# Patient Record
Sex: MALE | Race: BLACK OR AFRICAN AMERICAN | NOT HISPANIC OR LATINO | Employment: FULL TIME | URBAN - METROPOLITAN AREA
[De-identification: names, ages, dates, MRNs, and addresses within clinical notes are randomized per-mention and may not be internally consistent; named-entity substitution may affect disease eponyms.]

---

## 2021-07-30 ENCOUNTER — APPOINTMENT (OUTPATIENT)
Dept: RADIOLOGY | Facility: MEDICAL CENTER | Age: 26
End: 2021-07-30
Attending: EMERGENCY MEDICINE

## 2021-07-30 ENCOUNTER — HOSPITAL ENCOUNTER (EMERGENCY)
Facility: MEDICAL CENTER | Age: 26
End: 2021-07-30
Attending: EMERGENCY MEDICINE

## 2021-07-30 VITALS
DIASTOLIC BLOOD PRESSURE: 90 MMHG | TEMPERATURE: 98.2 F | BODY MASS INDEX: 42.66 KG/M2 | OXYGEN SATURATION: 95 % | HEART RATE: 94 BPM | SYSTOLIC BLOOD PRESSURE: 148 MMHG | HEIGHT: 72 IN | WEIGHT: 315 LBS | RESPIRATION RATE: 20 BRPM

## 2021-07-30 DIAGNOSIS — R06.02 SOB (SHORTNESS OF BREATH): ICD-10-CM

## 2021-07-30 DIAGNOSIS — R60.9 PERIPHERAL EDEMA: ICD-10-CM

## 2021-07-30 LAB
ANION GAP SERPL CALC-SCNC: 16 MMOL/L (ref 7–16)
BASOPHILS # BLD AUTO: 0.4 % (ref 0–1.8)
BASOPHILS # BLD: 0.05 K/UL (ref 0–0.12)
BUN SERPL-MCNC: 10 MG/DL (ref 8–22)
CALCIUM SERPL-MCNC: 9.9 MG/DL (ref 8.4–10.2)
CHLORIDE SERPL-SCNC: 101 MMOL/L (ref 96–112)
CO2 SERPL-SCNC: 21 MMOL/L (ref 20–33)
CREAT SERPL-MCNC: 1 MG/DL (ref 0.5–1.4)
EKG IMPRESSION: NORMAL
EOSINOPHIL # BLD AUTO: 0.01 K/UL (ref 0–0.51)
EOSINOPHIL NFR BLD: 0.1 % (ref 0–6.9)
ERYTHROCYTE [DISTWIDTH] IN BLOOD BY AUTOMATED COUNT: 42.4 FL (ref 35.9–50)
GLUCOSE SERPL-MCNC: 73 MG/DL (ref 65–99)
HCT VFR BLD AUTO: 44.5 % (ref 42–52)
HGB BLD-MCNC: 14.4 G/DL (ref 14–18)
IMM GRANULOCYTES # BLD AUTO: 0.07 K/UL (ref 0–0.11)
IMM GRANULOCYTES NFR BLD AUTO: 0.5 % (ref 0–0.9)
LYMPHOCYTES # BLD AUTO: 3.18 K/UL (ref 1–4.8)
LYMPHOCYTES NFR BLD: 24.6 % (ref 22–41)
MCH RBC QN AUTO: 27 PG (ref 27–33)
MCHC RBC AUTO-ENTMCNC: 32.4 G/DL (ref 33.7–35.3)
MCV RBC AUTO: 83.3 FL (ref 81.4–97.8)
MONOCYTES # BLD AUTO: 0.93 K/UL (ref 0–0.85)
MONOCYTES NFR BLD AUTO: 7.2 % (ref 0–13.4)
NEUTROPHILS # BLD AUTO: 8.7 K/UL (ref 1.82–7.42)
NEUTROPHILS NFR BLD: 67.2 % (ref 44–72)
NRBC # BLD AUTO: 0 K/UL
NRBC BLD-RTO: 0 /100 WBC
NT-PROBNP SERPL IA-MCNC: 47 PG/ML (ref 0–125)
PLATELET # BLD AUTO: 217 K/UL (ref 164–446)
PMV BLD AUTO: 11.7 FL (ref 9–12.9)
POTASSIUM SERPL-SCNC: 3.7 MMOL/L (ref 3.6–5.5)
RBC # BLD AUTO: 5.34 M/UL (ref 4.7–6.1)
SODIUM SERPL-SCNC: 138 MMOL/L (ref 135–145)
TROPONIN T SERPL-MCNC: 15 NG/L (ref 6–19)
WBC # BLD AUTO: 12.9 K/UL (ref 4.8–10.8)

## 2021-07-30 PROCEDURE — 93005 ELECTROCARDIOGRAM TRACING: CPT | Performed by: EMERGENCY MEDICINE

## 2021-07-30 PROCEDURE — 83880 ASSAY OF NATRIURETIC PEPTIDE: CPT

## 2021-07-30 PROCEDURE — 94760 N-INVAS EAR/PLS OXIMETRY 1: CPT

## 2021-07-30 PROCEDURE — 99284 EMERGENCY DEPT VISIT MOD MDM: CPT

## 2021-07-30 PROCEDURE — 93005 ELECTROCARDIOGRAM TRACING: CPT

## 2021-07-30 PROCEDURE — 85025 COMPLETE CBC W/AUTO DIFF WBC: CPT

## 2021-07-30 PROCEDURE — 84484 ASSAY OF TROPONIN QUANT: CPT

## 2021-07-30 PROCEDURE — 71046 X-RAY EXAM CHEST 2 VIEWS: CPT

## 2021-07-30 PROCEDURE — 80048 BASIC METABOLIC PNL TOTAL CA: CPT

## 2021-07-30 RX ORDER — KETOROLAC TROMETHAMINE 30 MG/ML
30 INJECTION, SOLUTION INTRAMUSCULAR; INTRAVENOUS ONCE
Status: DISCONTINUED | OUTPATIENT
Start: 2021-07-30 | End: 2021-07-31 | Stop reason: HOSPADM

## 2021-07-31 NOTE — ED PROVIDER NOTES
"ED Provider Note        Primary care provider: No primary care provider on file.    I verified that the patient was wearing a mask and I was wearing appropriate PPE every time I entered the room. The patient's mask was on the patient at all times during my encounter except for a brief view of the oropharynx.      CHIEF COMPLAINT  Chief Complaint   Patient presents with   • Shortness of Breath     Reports swollen feet, SOB, and syncope \"after a 5.5 mile walk\" today.        HPI  Rick Smith is a 25 y.o. male who presents to the Emergency Department with chief complaint of foot pain.  Patient also reports that his feet are slightly swollen and he is having some shortness of breath.  Patient states that he had to walk 5-1/2 miles today.  He states that he was walking this far to go to [employer] to be cleared to return to work.  Patient was put on work restrictions after he had a accident that resulted in some right chest wall pain.  Pain is rated as a 20 out of 10 at this time he also reports some shortness of breath.  No fevers no chills he has been vaccinated against COVID-19 he denies any urinary or bowel issues no other acute symptoms or concerns at this time.    REVIEW OF SYSTEMS  10 systems reviewed and otherwise negative, pertinent positives and negatives listed in the history of present illness.    PAST MEDICAL HISTORY   has a past medical history of Hypertension.    SURGICAL HISTORY  patient denies any surgical history    SOCIAL HISTORY  Social History     Tobacco Use   • Smoking status: Current Every Day Smoker     Types: Cigarettes   • Smokeless tobacco: Never Used   • Tobacco comment: 1pk/week   Substance Use Topics   • Alcohol use: Yes     Comment: occ   • Drug use: Yes     Comment: chandrika      Social History     Substance and Sexual Activity   Drug Use Yes    Comment: chandrika       FAMILY HISTORY  Non-Contributory    CURRENT MEDICATIONS  Home Medications    **Home medications have not " yet been reviewed for this encounter**         ALLERGIES  No Known Allergies    PHYSICAL EXAM  VITAL SIGNS: /88   Pulse (!) 106   Temp 36.8 °C (98.2 °F) (Temporal)   Resp (!) 22   Ht 1.829 m (6')   Wt (!) 218 kg (479 lb 15.1 oz)   SpO2 95%   BMI 65.09 kg/m²   Pulse ox interpretation: I interpret this pulse ox as normal.  Constitutional: Alert and oriented x 3, no acute distress  HEENT: Atraumatic normocephalic, pupils are equal round, extraocular movements are intact. The nares is clear, external ears are normal, mouth shows moist mucous membranes  Neck: no obvious JVD or tracheal deviation  Cardiovascular: Regular rate and rhythm no murmur rub or gallop   Thorax & Lungs: No respiratory distress, no wheezes rales or rhonchi, No chest tenderness.   GI: Morbidly obese, soft nontender nondistended positive bowel sounds, no peritoneal signs  Skin: Warm dry no obvious acute rash or lesion  Musculoskeletal: Moving all extremities with normal range strength, no acute  deformity  Neurologic: Cranial nerves III through XII are grossly intact, no sensory deficit, no cerebellar dysfunction   Psychiatric: Appropriate affect for situation at this time      DIAGNOSTIC STUDIES / PROCEDURES  LABS      Results for orders placed or performed during the hospital encounter of 07/30/21   CBC w/ Differential   Result Value Ref Range    WBC 12.9 (H) 4.8 - 10.8 K/uL    RBC 5.34 4.70 - 6.10 M/uL    Hemoglobin 14.4 14.0 - 18.0 g/dL    Hematocrit 44.5 42.0 - 52.0 %    MCV 83.3 81.4 - 97.8 fL    MCH 27.0 27.0 - 33.0 pg    MCHC 32.4 (L) 33.7 - 35.3 g/dL    RDW 42.4 35.9 - 50.0 fL    Platelet Count 217 164 - 446 K/uL    MPV 11.7 9.0 - 12.9 fL    Neutrophils-Polys 67.20 44.00 - 72.00 %    Lymphocytes 24.60 22.00 - 41.00 %    Monocytes 7.20 0.00 - 13.40 %    Eosinophils 0.10 0.00 - 6.90 %    Basophils 0.40 0.00 - 1.80 %    Immature Granulocytes 0.50 0.00 - 0.90 %    Nucleated RBC 0.00 /100 WBC    Neutrophils (Absolute) 8.70 (H) 1.82 -  7.42 K/uL    Lymphs (Absolute) 3.18 1.00 - 4.80 K/uL    Monos (Absolute) 0.93 (H) 0.00 - 0.85 K/uL    Eos (Absolute) 0.01 0.00 - 0.51 K/uL    Baso (Absolute) 0.05 0.00 - 0.12 K/uL    Immature Granulocytes (abs) 0.07 0.00 - 0.11 K/uL    NRBC (Absolute) 0.00 K/uL   Basic Metabolic Panel (BMP)   Result Value Ref Range    Sodium 138 135 - 145 mmol/L    Potassium 3.7 3.6 - 5.5 mmol/L    Chloride 101 96 - 112 mmol/L    Co2 21 20 - 33 mmol/L    Glucose 73 65 - 99 mg/dL    Bun 10 8 - 22 mg/dL    Creatinine 1.00 0.50 - 1.40 mg/dL    Calcium 9.9 8.4 - 10.2 mg/dL    Anion Gap 16.0 7.0 - 16.0   proBrain Natriuretic Peptide, NT   Result Value Ref Range    NT-proBNP 47 0 - 125 pg/mL   Troponin STAT   Result Value Ref Range    Troponin T 15 6 - 19 ng/L   ESTIMATED GFR   Result Value Ref Range    GFR If African American >60 >60 mL/min/1.73 m 2    GFR If Non African American >60 >60 mL/min/1.73 m 2   EKG   Result Value Ref Range    Report       [facility] Emergency Dept.    Test Date:  2021  Pt Name:    SRINI PERDOMO                   Department: [facility]  MRN:        [id number]                      Room:  Gender:     Male                         Technician: GT  :        1995                   Requested By:ER TRIAGE PROTOCOL  Order #:    [id number]                    Reading MD: EUSEBIO KINNEY MD    Measurements  Intervals                                Axis  Rate:       95                           P:          74  WA:         180                          QRS:        -3  QRSD:       87                           T:          28  QT:         339  QTc:        426    Interpretive Statements  normal sinus rhythm at  95   , no ST elevation no ST depression no T-wave  inversion appropriate R-wave progression normal axis normal intervals no  other  ischemic or arrhythmic features, Q waves limited to lead III  Electronically Signed On 2021 22:08:39 PDT by EUSEBIO KINNEY MD         All labs reviewed by  me.      RADIOLOGY  DX-CHEST-2 VIEWS   Final Result      No radiographic evidence of acute cardiopulmonary process.        The radiologist's interpretation of all radiological studies have been reviewed by me.    COURSE & MEDICAL DECISION MAKING  Pertinent Labs & Imaging studies reviewed. (See chart for details)    8:52 PM - Patient seen and examined at bedside.         Patient noted to have slightly elevated blood pressure likely circumstantial secondary to presenting complaint. Referred to primary care physician for further evaluation.        Medical Decision Makin-year-old male morbidly obese 218 kg reports that he walks 5-1/2 miles and became dehydrated overheated notes some leg pain.  He is no longer having pain at this time his chest x-ray is negative his troponin is negative BNP is negative other labs as above showed no sign of dehydration his vital signs are unremarkable.  Patient's requesting food and drink at this time in no apparent distress vital signs are improved given instructions to follow-up with primary care to return here for any worsening symptoms or concerns otherwise discharged stable and improved condition.    /90   Pulse 94   Temp 36.8 °C (98.2 °F) (Temporal)   Resp 20   Ht 1.829 m (6')   Wt (!) 218 kg (479 lb 15.1 oz)   SpO2 95%   BMI 65.09 kg/m²     [address] [address] [phone]  Schedule an appointment as soon as possible for a visit   for establishment of primary care, for blood pressure management    [facility], Emergency Dept  [address]  [facility] [address] [phone]    in 12-24 hours if symptoms persist, immediately If symptoms worsen, or if you develop any other symptoms or concerns      There are no discharge medications for this patient.      FINAL IMPRESSION  1. SOB (shortness of breath) Active   2. Peripheral edema Active   3.  Morbid obesity      This dictation has been  created using voice recognition software and/or scribes. The accuracy of the dictation is limited by the abilities of the software and the expertise of the scribes. I expect there may be some errors of grammar and possibly content. I made every attempt to manually correct the errors within my dictation. However, errors related to voice recognition software and/or scribes may still exist and should be interpreted within the appropriate context.

## 2021-07-31 NOTE — ED NOTES
AYE Kam after this pt walked 5 miles to [facility].He was asking for water but refused [facility] offer for water. He then requested to come to the ED.CRISTINA reports that this is his normal modus operandi.

## 2021-07-31 NOTE — ED NOTES
Patient verbalized understanding to plan of care and discharge information. Patient in stable condition. No signs of distress. Patient pain free. Patient ambulatory out of ED to waiting area with stable gait by self. Patient given cab voucher to return home.

## 2021-08-16 ENCOUNTER — HOSPITAL ENCOUNTER (EMERGENCY)
Age: 26
Discharge: HOME | End: 2021-08-17

## 2021-08-16 DIAGNOSIS — F23 ACUTE PSYCHOSIS (HCC): Primary | ICD-10-CM

## 2021-08-16 DIAGNOSIS — F22 DELUSIONAL DISORDER (HCC): ICD-10-CM

## 2021-08-16 LAB
ACETAMINOPHEN LEVEL: < 5 UG/ML (ref 0–20)
ALBUMIN SERPL-MCNC: 4.9 G/DL (ref 3.5–5.1)
ALP BLD-CCNC: 61 U/L (ref 38–126)
ALT SERPL-CCNC: 41 U/L (ref 11–66)
AMPHETAMINE+METHAMPHETAMINE URINE SCREEN: NEGATIVE
ANION GAP SERPL CALCULATED.3IONS-SCNC: 11 MEQ/L (ref 8–16)
AST SERPL-CCNC: 30 U/L (ref 5–40)
BACTERIA: ABNORMAL /HPF
BARBITURATE QUANTITATIVE URINE: NEGATIVE
BASOPHILS # BLD: 0.4 %
BASOPHILS ABSOLUTE: 0.1 THOU/MM3 (ref 0–0.1)
BENZODIAZEPINE QUANTITATIVE URINE: NEGATIVE
BILIRUB SERPL-MCNC: 0.6 MG/DL (ref 0.3–1.2)
BILIRUBIN DIRECT: < 0.2 MG/DL (ref 0–0.3)
BILIRUBIN URINE: ABNORMAL
BLOOD, URINE: NEGATIVE
BUN BLDV-MCNC: 10 MG/DL (ref 7–22)
CALCIUM SERPL-MCNC: 9.8 MG/DL (ref 8.5–10.5)
CANNABINOID QUANTITATIVE URINE: POSITIVE
CASTS 2: ABNORMAL /LPF
CASTS UA: ABNORMAL /LPF
CHARACTER, URINE: ABNORMAL
CHLORIDE BLD-SCNC: 103 MEQ/L (ref 98–111)
CO2: 25 MEQ/L (ref 23–33)
COCAINE METABOLITE QUANTITATIVE URINE: NEGATIVE
COLOR: ABNORMAL
CREAT SERPL-MCNC: 0.8 MG/DL (ref 0.4–1.2)
CRYSTALS, UA: ABNORMAL
EOSINOPHIL # BLD: 0 %
EOSINOPHILS ABSOLUTE: 0 THOU/MM3 (ref 0–0.4)
EPITHELIAL CELLS, UA: ABNORMAL /HPF
ERYTHROCYTE [DISTWIDTH] IN BLOOD BY AUTOMATED COUNT: 15.3 % (ref 11.5–14.5)
ERYTHROCYTE [DISTWIDTH] IN BLOOD BY AUTOMATED COUNT: 48.2 FL (ref 35–45)
ETHYL ALCOHOL, SERUM: < 0.01 %
GFR SERPL CREATININE-BSD FRML MDRD: > 90 ML/MIN/1.73M2
GLUCOSE BLD-MCNC: 114 MG/DL (ref 70–108)
GLUCOSE URINE: NEGATIVE MG/DL
HCT VFR BLD CALC: 47.1 % (ref 42–52)
HEMOGLOBIN: 14.6 GM/DL (ref 14–18)
ICTOTEST: NEGATIVE
IMMATURE GRANS (ABS): 0.04 THOU/MM3 (ref 0–0.07)
IMMATURE GRANULOCYTES: 0.3 %
KETONES, URINE: 15
LEUKOCYTE ESTERASE, URINE: ABNORMAL
LYMPHOCYTES # BLD: 14.8 %
LYMPHOCYTES ABSOLUTE: 1.9 THOU/MM3 (ref 1–4.8)
MCH RBC QN AUTO: 26.9 PG (ref 26–33)
MCHC RBC AUTO-ENTMCNC: 31 GM/DL (ref 32.2–35.5)
MCV RBC AUTO: 86.9 FL (ref 80–94)
MISCELLANEOUS 2: ABNORMAL
MONOCYTES # BLD: 5.2 %
MONOCYTES ABSOLUTE: 0.7 THOU/MM3 (ref 0.4–1.3)
MUCUS: ABNORMAL
NITRITE, URINE: NEGATIVE
NUCLEATED RED BLOOD CELLS: 0 /100 WBC
OPIATES, URINE: NEGATIVE
OSMOLALITY CALCULATION: 277.4 MOSMOL/KG (ref 275–300)
OXYCODONE: NEGATIVE
PH UA: 5.5 (ref 5–9)
PHENCYCLIDINE QUANTITATIVE URINE: NEGATIVE
PLATELET # BLD: 192 THOU/MM3 (ref 130–400)
PMV BLD AUTO: 12.4 FL (ref 9.4–12.4)
POTASSIUM SERPL-SCNC: 3.9 MEQ/L (ref 3.5–5.2)
PROTEIN UA: 30
RBC # BLD: 5.42 MILL/MM3 (ref 4.7–6.1)
RBC URINE: ABNORMAL /HPF
RENAL EPITHELIAL, UA: ABNORMAL
SALICYLATE, SERUM: < 0.3 MG/DL (ref 2–10)
SARS-COV-2, NAAT: NOT DETECTED
SEG NEUTROPHILS: 79.3 %
SEGMENTED NEUTROPHILS ABSOLUTE COUNT: 10.3 THOU/MM3 (ref 1.8–7.7)
SODIUM BLD-SCNC: 139 MEQ/L (ref 135–145)
SPECIFIC GRAVITY, URINE: > 1.03 (ref 1–1.03)
TOTAL PROTEIN: 7.8 G/DL (ref 6.1–8)
TSH SERPL DL<=0.05 MIU/L-ACNC: 1.13 UIU/ML (ref 0.4–4.2)
UROBILINOGEN, URINE: 1 EU/DL (ref 0–1)
WBC # BLD: 13 THOU/MM3 (ref 4.8–10.8)
WBC UA: > 100 /HPF
YEAST: ABNORMAL

## 2021-08-16 PROCEDURE — 80053 COMPREHEN METABOLIC PANEL: CPT

## 2021-08-16 PROCEDURE — 84443 ASSAY THYROID STIM HORMONE: CPT

## 2021-08-16 PROCEDURE — 82077 ASSAY SPEC XCP UR&BREATH IA: CPT

## 2021-08-16 PROCEDURE — 6370000000 HC RX 637 (ALT 250 FOR IP): Performed by: NURSE PRACTITIONER

## 2021-08-16 PROCEDURE — 80307 DRUG TEST PRSMV CHEM ANLYZR: CPT

## 2021-08-16 PROCEDURE — 81001 URINALYSIS AUTO W/SCOPE: CPT

## 2021-08-16 PROCEDURE — 85025 COMPLETE CBC W/AUTO DIFF WBC: CPT

## 2021-08-16 PROCEDURE — 87086 URINE CULTURE/COLONY COUNT: CPT

## 2021-08-16 PROCEDURE — 87077 CULTURE AEROBIC IDENTIFY: CPT

## 2021-08-16 PROCEDURE — 80143 DRUG ASSAY ACETAMINOPHEN: CPT

## 2021-08-16 PROCEDURE — 87635 SARS-COV-2 COVID-19 AMP PRB: CPT

## 2021-08-16 PROCEDURE — 82248 BILIRUBIN DIRECT: CPT

## 2021-08-16 PROCEDURE — 36415 COLL VENOUS BLD VENIPUNCTURE: CPT

## 2021-08-16 PROCEDURE — 99285 EMERGENCY DEPT VISIT HI MDM: CPT

## 2021-08-16 PROCEDURE — 80179 DRUG ASSAY SALICYLATE: CPT

## 2021-08-16 RX ORDER — GRANULES FOR ORAL 3 G/1
3 POWDER ORAL ONCE
Status: COMPLETED | OUTPATIENT
Start: 2021-08-16 | End: 2021-08-16

## 2021-08-16 RX ADMIN — GRANULES FOR ORAL SOLUTION 1 PACKET: 3 POWDER ORAL at 22:47

## 2021-08-16 ASSESSMENT — SLEEP AND FATIGUE QUESTIONNAIRES
DIFFICULTY FALLING ASLEEP: YES
DIFFICULTY ARISING: NO
SLEEP PATTERN: DIFFICULTY FALLING ASLEEP;DISTURBED/INTERRUPTED SLEEP
DO YOU HAVE DIFFICULTY SLEEPING: YES
DIFFICULTY STAYING ASLEEP: YES
AVERAGE NUMBER OF SLEEP HOURS: 3
RESTFUL SLEEP: NO
DO YOU USE A SLEEP AID: NO

## 2021-08-16 ASSESSMENT — LIFESTYLE VARIABLES: HISTORY_ALCOHOL_USE: NO

## 2021-08-16 NOTE — ED NOTES
Patient provided extra pillow at this time. Patient resting in bed. Respirations easy and unlabored. No distress noted. Patient remains in ligature resistant room under constant supervision. Call light within reach.        Mckenna Montague RN  08/16/21 2318 10

## 2021-08-16 NOTE — ED NOTES
Patient resting in bed. Respirations easy and unlabored. No distress noted. Call light within reach.        Dianne Diaz RN  08/16/21 9558

## 2021-08-16 NOTE — ED NOTES
[facility] This nurse assuming care. Respirations easy and unlabored. Patient walking around saferooms at this time. Call light within reach. Patient remains in ligature resistant room under constant supervision. Covid swab collected and sent to lab.        Sharon Arreola RN  08/16/21 9071

## 2021-08-16 NOTE — PROGRESS NOTES
Provisional Diagnosis:    Unspecified Psychosis      Risk, Psychosocial and Contextual Factors:  Delusional thought     Current  Treatment:  Denies      Present Suicidal Behavior:      Verbal:  Denies          Attempt: Denies     Access to Weapons:  Denies     Current Suicide Risk: Low, Moderate or High:    Low    Past Suicidal Behavior:       Verbal: Denies     Attempt: Denies     Self-Injurious/Self-Mutilation: Denies     Traumatic Event Within Past 2 Weeks:   Denies     Current Abuse: Denies     Legal:  Denies     Violence: Denies     Protective Factors:   Pt reportedly has a service dog, the dog is currently with the     Housing:    Pt reportedly has an apartment in [place] [address]    CPAP/Oxygen/Ambulation Difficulties: Denies     Basic Vital Signs Normal?: Check with Patients Nurse prior to [address]?: Check with Patients Nurse prior to Calling Psychiatry    Clinical Summary:      Pt is a 30year old male escorted to [facility] ED involuntarily by [facility] who placed pt on an [facility]. According to the [facility]:    Raquel Harmon said he was working with [employer]. He said people are after him because he had some harming information about [employer]. He said he was on his way to [facility]. Raquel Harmon is paranoid and fearful for his life. Per [employer], pt contacted [facility] who contacted them regarding pt. 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 Pt reportedly met with his contact today \"he is fully aware of what's going on, it went bad\", \"I had X amount of time to get out, he said go [place] but there was a road block now I'm here\". Suicidal thoughts denied, homicidal thoughts denied, hallucinations denied however pt constantly looking around the room, delusional thought evident. Pt reportedly reside at [address], [place] [address], [address].  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 Pt is not currently linked to outpatient mental health services nor prescribed psychotropic medication. Pt report a history of inpatient psychiatric treatment during childhood \"it was weaponizing, my mom used it when I was disrespectful, I had adjustment issues\". Pt denies alcohol use, reportedly smoked marijuana \"when I was out [employer]", date of last use unknown \"I'm not a heavy weed smoker, jut put it that way\". Pt however concerned there may be marijuana in his system \"I was held capture recently somewhere and they may have gave me weed\". Pt alert, oriented to person/place/time, impaired judgment/insight, cooperative, tangential thought, flight of ideas, cooperative. Level of Care Disposition:      4066  Pt medically cleared by Bereket Cox CNP.    1639  Pt pacing, responding to internal stimuli. 1640  Call to Dr. Elia Patel, no answer, voicemail left requesting a return call. 1642  Call from Dr. Elia Patel who recommend transfer for inpatient psychiatric treatment as pt is in need of a private room. 4E unable to accommodate at this time due to capacity. Bereket Cox CNP updated, will order covid test as needed for transfer and will enter medical clearance in provider note. Pt updated and state \"I'm not talking to a [address], they was mad at [facility] because he didn't take communion\". Pt constantly talking to the camera in the room. 1810  Call to [facility], consulted with Nurse Krystal Palomares who will send the information Nurse Pam Cisneros for placement. Clinician recommended placement at [facility]. Meal tray approved by Bereket Cox CNP and ordered by Clinician.    1822  Call from Nurse Pam Cisneros of [facility] who state [facility] is currently in report, unable to connect with them until after 7pm.

## 2021-08-16 NOTE — ED NOTES
Pt resting in bed. Pt is anxious to know the plan of care. Pt states he still wants to talk to someone in homeland security.       Nuha Jacobsen RN  08/16/21 4508

## 2021-08-16 NOTE — ED TRIAGE NOTES
Pt to the ED via [facility] for psych eval. Officer states they were called by the pt because the pt states he has been trying to get into contact with homeland security because he has information on [place]. Pt told police that he is scared that [place] are going to get him. \" Pt is A/O upon ED arrival and is cooperative. Pt placed in ligature resistant room 26. Pt being placed under an [facility] by police.

## 2021-08-16 NOTE — ED PROVIDER NOTES
[facility] Emergency Department    CHIEF COMPLAINT       Chief Complaint   Patient presents with   [employer] Psychiatric Evaluation       Nurses Notes reviewed and I agree except as noted in the HPI. HISTORY OF PRESENT ILLNESS    Gene Prince chaveza 30 y.o. male who presents to the ED for psychiatric evaluation. 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 He is speaking very rapidly and doesn't make sense. He is under [facility] from police      HPI was provided by the patient and police    REVIEW OF SYSTEMS     Review of Systems   Unable to perform ROS: Psychiatric disorder        All other systems negative except as noted. PAST MEDICAL HISTORY   No past medical history on file. SURGICALHISTORY      has no past surgical history on file. CURRENT MEDICATIONS       Previous Medications    No medications on file       ALLERGIES     has No Known Allergies. FAMILY HISTORY     has no family status information on file. family history is not on file. SOCIAL HISTORY       Social History     Socioeconomic History    Marital status: Single     Spouse name: Not on file    Number of children: Not on file    Years of education: Not on file    Highest education level: Not on file   Occupational History    Not on file   Tobacco Use    Smoking status: Not on file   Substance and Sexual Activity    Alcohol use: Not on file    Drug use: Not on file    Sexual activity: Not on file   Other Topics Concern    Not on file   Social History Narrative    Not on file     Social Determinants of Health     Financial Resource Strain:     Difficulty of Paying Living Expenses:    Food Insecurity:     Worried About Running Out of Food in the Last Year:     [address] in the Last Year:    Transportation Needs:     Lack of Transportation (Medical):      Lack of Transportation (Non-Medical):    Physical Activity:     Days of Exercise per Week:     Minutes of Exercise per Session: Stress:     Feeling of Stress :    Social Connections:     Frequency of Communication with Friends and Family:     Frequency of Social Gatherings with Friends and Family:     Attends S[employer] Services:     Active Member of Clubs or Organizations:     Attends Club or Organization Meetings:     Marital Status:    Intimate Partner Violence:     Fear of Current or Ex-Partner:     Emotionally Abused:     Physically Abused:     Sexually Abused:        PHYSICAL EXAM     INITIAL VITALS:  weight is 425 lb (192.8 kg) (abnormal). His oral temperature is 98.3 °F (36.8 °C). His blood pressure is 179/86 (abnormal) and his pulse is 93. His respiration is 18 and oxygen saturation is 98%. Physical Exam  Constitutional:       Appearance: Normal appearance. He is well-developed. He is not ill-appearing. HENT:      Head: Normocephalic and atraumatic. Nose: Nose normal.      Mouth/Throat:      Mouth: Mucous membranes are moist.      Pharynx: Oropharynx is clear. Eyes:      Conjunctiva/sclera: Conjunctivae normal.   Cardiovascular:      Rate and Rhythm: Normal rate. Pulses: Normal pulses. Pulmonary:      Effort: Pulmonary effort is normal.   Abdominal:      Palpations: Abdomen is soft. Musculoskeletal:         General: Normal range of motion. Cervical back: Normal range of motion. Skin:     General: Skin is warm and dry. Capillary Refill: Capillary refill takes less than 2 seconds. Neurological:      General: No focal deficit present. Mental Status: He is alert and oriented to person, place, and time. Psychiatric:         Attention and Perception: He is inattentive. He perceives auditory and visual hallucinations. Mood and Affect: Affect is flat. Speech: Speech is rapid and pressured and tangential.         Behavior: Behavior is hyperactive. Thought Content:  Thought content is paranoid and delusional.         DIFFERENTIAL DIAGNOSIS:   Delusional, hallucinations    DIAGNOSTIC RESULTS     EKG: All EKG's are interpreted by the Emergency Department Physician who eithersigns or Co-signs this chart in the absence of a cardiologist.        RADIOLOGY: non-plainfilm images(s) such as CT, Ultrasound and MRI are read by the radiologist.  Plain radiographic images are visualized and preliminarily interpreted by the emergency physician unless otherwise stated below.   No orders to display         LABS:   Labs Reviewed   BASIC METABOLIC PANEL - Abnormal; Notable for the following components:       Result Value    Glucose 114 (*)     All other components within normal limits   CBC WITH AUTO DIFFERENTIAL - Abnormal; Notable for the following components:    WBC 13.0 (*)     MCHC 31.0 (*)     RDW-CV 15.3 (*)     RDW-SD 48.2 (*)     Segs Absolute 10.3 (*)     All other components within normal limits   SALICYLATE LEVEL - Abnormal; Notable for the following components:    Salicylate, Serum < 0.3 (*)     All other components within normal limits   URINE WITH REFLEXED MICRO - Abnormal; Notable for the following components:    Bilirubin Urine SMALL (*)     Ketones, Urine 15 (*)     Specific Gravity, Urine > 1.030 (*)     Protein, UA 30 (*)     Leukocyte Esterase, Urine MODERATE (*)     Color, UA DK YELLOW (*)     Character, Urine CLOUDY (*)     All other components within normal limits   CULTURE, REFLEXED, URINE   COVID-19, RAPID   ACETAMINOPHEN LEVEL   ETHANOL   HEPATIC FUNCTION PANEL   TSH WITHOUT REFLEX   URINE DRUG SCREEN   ANION GAP   GLOMERULAR FILTRATION RATE, ESTIMATED   OSMOLALITY   BILE ACIDS, TOTAL   COVID-19       EMERGENCY DEPARTMENT COURSE:   Vitals:    Vitals:    08/16/21 1207 08/16/21 1641   BP: (!) 187/85 (!) 179/86   Pulse: 107 93   Resp: 18 18   Temp: 98.3 °F (36.8 °C)    TempSrc: Oral    SpO2: 98% 98%   Weight: (!) 425 lb (192.8 kg)           [facility]    The patient was seen and evaluated within the ED today for the evaluation of acute psychosis. Physical exam revealed no significant abnormalities or concerns. I completed a medical evaluation of the patient and ordered appropriate labs which were unremarkable. [facility] and social work completed a full psychiatric evaluation of the patient and determined that he met  transfer to an outside psychiatric facility criteria. I medically cleared the patient. [facility] and social work's noted should be consulted for the psychiatric evaluation and reason for transfer to an outside psychiatric facility. Medications - No data to display      Patient was seen independently by myself. The patient's final impression and disposition and plan was determined by myself. Strict return precautions and follow up instructions were discussed with the patient prior to discharge, with which the patient agrees. Physical assessment findings, diagnostic testing(s) if applicable, and vital signs reviewed with patient/patient representative. Questions answered. Medications asdirected, including OTC medications for supportive care. Education provided on medications. Differential diagnosis(s) discussed with patient/patient representative. Home care/self care instructions reviewed withpatient/patient representative. Patient is to follow-up with family care provider in 2-3 days if no improvement. Patient is to go to the emergency department if symptoms worsen. Patient/patient representative isaware of care plan, questions answered, verbalizes understanding and is in agreement. CRITICAL CARE:   None    CONSULTS:  [facility]    PROCEDURES:  None    FINAL IMPRESSION     1. Acute psychosis ([facility] [address]    2. Delusional disorder [facility])          DISPOSITION/PLAN   DISPOSITION Decision To Transfer 08/16/2021 04:56:37 PM      PATIENT REFERREDTO:  No follow-up provider specified.     DISCHARGE MEDICATIONS:  New Prescriptions    No medications on file       (Please note that portions of this note were completed with a voice recognition program.  Efforts were made to edit the dictations but occasionally words are mis-transcribed.)         ELADIO Rodriguez - ELADIO Doshi CNP  08/16/21 8293

## 2021-08-17 ENCOUNTER — HOSPITAL ENCOUNTER (OUTPATIENT)
Age: 26
Setting detail: OBSERVATION
LOS: 1 days | Discharge: HOME | End: 2021-08-17
Attending: PSYCHIATRY & NEUROLOGY | Admitting: PSYCHIATRY & NEUROLOGY

## 2021-08-17 VITALS
WEIGHT: 315 LBS | TEMPERATURE: 97.9 F | HEART RATE: 91 BPM | OXYGEN SATURATION: 100 % | RESPIRATION RATE: 16 BRPM | DIASTOLIC BLOOD PRESSURE: 76 MMHG | SYSTOLIC BLOOD PRESSURE: 138 MMHG

## 2021-08-17 VITALS
DIASTOLIC BLOOD PRESSURE: 99 MMHG | SYSTOLIC BLOOD PRESSURE: 124 MMHG | BODY MASS INDEX: 44.1 KG/M2 | HEIGHT: 71 IN | WEIGHT: 315 LBS | RESPIRATION RATE: 16 BRPM | HEART RATE: 108 BPM | TEMPERATURE: 98.2 F

## 2021-08-17 PROBLEM — F20.0 ACUTE EXACERBATION OF CHRONIC PARANOID SCHIZOPHRENIA (HCC): Status: ACTIVE | Noted: 2021-08-17

## 2021-08-17 PROCEDURE — G0378 HOSPITAL OBSERVATION PER HR: HCPCS

## 2021-08-17 PROCEDURE — 99236 HOSP IP/OBS SAME DATE HI 85: CPT | Performed by: PSYCHIATRY & NEUROLOGY

## 2021-08-17 PROCEDURE — APPSS60 APP SPLIT SHARED TIME 46-60 MINUTES

## 2021-08-17 RX ORDER — ACETAMINOPHEN 325 MG/1
650 TABLET ORAL EVERY 4 HOURS PRN
Status: DISCONTINUED | OUTPATIENT
Start: 2021-08-17 | End: 2021-08-17 | Stop reason: HOSPADM

## 2021-08-17 RX ORDER — HALOPERIDOL 5 MG
5 TABLET ORAL EVERY 6 HOURS PRN
Status: DISCONTINUED | OUTPATIENT
Start: 2021-08-17 | End: 2021-08-17 | Stop reason: HOSPADM

## 2021-08-17 RX ORDER — POLYETHYLENE GLYCOL 3350 17 G/17G
17 POWDER, FOR SOLUTION ORAL DAILY PRN
Status: DISCONTINUED | OUTPATIENT
Start: 2021-08-17 | End: 2021-08-17 | Stop reason: HOSPADM

## 2021-08-17 RX ORDER — LORAZEPAM 2 MG/ML
2 INJECTION INTRAMUSCULAR EVERY 6 HOURS PRN
Status: DISCONTINUED | OUTPATIENT
Start: 2021-08-17 | End: 2021-08-17 | Stop reason: HOSPADM

## 2021-08-17 RX ORDER — LORAZEPAM 1 MG/1
2 TABLET ORAL EVERY 6 HOURS PRN
Status: DISCONTINUED | OUTPATIENT
Start: 2021-08-17 | End: 2021-08-17 | Stop reason: HOSPADM

## 2021-08-17 RX ORDER — TRAZODONE HYDROCHLORIDE 50 MG/1
50 TABLET ORAL NIGHTLY PRN
Status: DISCONTINUED | OUTPATIENT
Start: 2021-08-17 | End: 2021-08-17 | Stop reason: HOSPADM

## 2021-08-17 RX ORDER — RISPERIDONE 1 MG/1
1 TABLET, FILM COATED ORAL 2 TIMES DAILY
Status: DISCONTINUED | OUTPATIENT
Start: 2021-08-17 | End: 2021-08-17 | Stop reason: HOSPADM

## 2021-08-17 RX ORDER — MAGNESIUM HYDROXIDE/ALUMINUM HYDROXICE/SIMETHICONE 120; 1200; 1200 MG/30ML; MG/30ML; MG/30ML
30 SUSPENSION ORAL EVERY 6 HOURS PRN
Status: DISCONTINUED | OUTPATIENT
Start: 2021-08-17 | End: 2021-08-17 | Stop reason: HOSPADM

## 2021-08-17 RX ORDER — DIPHENHYDRAMINE HYDROCHLORIDE 50 MG/ML
50 INJECTION INTRAMUSCULAR; INTRAVENOUS EVERY 6 HOURS PRN
Status: DISCONTINUED | OUTPATIENT
Start: 2021-08-17 | End: 2021-08-17 | Stop reason: HOSPADM

## 2021-08-17 RX ORDER — IBUPROFEN 400 MG/1
400 TABLET ORAL EVERY 6 HOURS PRN
Status: DISCONTINUED | OUTPATIENT
Start: 2021-08-17 | End: 2021-08-17 | Stop reason: HOSPADM

## 2021-08-17 RX ORDER — HYDROXYZINE 50 MG/1
50 TABLET, FILM COATED ORAL 3 TIMES DAILY PRN
Status: DISCONTINUED | OUTPATIENT
Start: 2021-08-17 | End: 2021-08-17 | Stop reason: HOSPADM

## 2021-08-17 RX ORDER — HALOPERIDOL 5 MG/ML
5 INJECTION INTRAMUSCULAR EVERY 6 HOURS PRN
Status: DISCONTINUED | OUTPATIENT
Start: 2021-08-17 | End: 2021-08-17 | Stop reason: HOSPADM

## 2021-08-17 ASSESSMENT — SLEEP AND FATIGUE QUESTIONNAIRES
RESTFUL SLEEP: NO
DIFFICULTY ARISING: NO
DIFFICULTY STAYING ASLEEP: YES
AVERAGE NUMBER OF SLEEP HOURS: 3
DIFFICULTY FALLING ASLEEP: YES
DO YOU USE A SLEEP AID: NO
SLEEP PATTERN: DIFFICULTY FALLING ASLEEP;DISTURBED/INTERRUPTED SLEEP
DO YOU HAVE DIFFICULTY SLEEPING: YES

## 2021-08-17 ASSESSMENT — PAIN SCALES - GENERAL: PAINLEVEL_OUTOF10: 0

## 2021-08-17 ASSESSMENT — LIFESTYLE VARIABLES: HISTORY_ALCOHOL_USE: NO

## 2021-08-17 NOTE — BH NOTE
[id number] [facility]  Discharge Note    Pt discharged with followings belongings:   Dentures: None  Vision - Corrective Lenses: Glasses  Hearing Aid: None  Jewelry: None  Body Piercings Removed: N/A  Clothing: Pants, Shirt, Socks, Undergarments (Comment)  Were All Patient Medications Collected?: Not Applicable  Other Valuables: Cell phone (see belonging sheet)   Valuables sent home with patient. Valuables retrieved from safe, Security envelope number:  [id number] and returned to patient. Patient education on aftercare instructions: yes  Information faxed to N/A by RN Patient verbalize understanding of AVS:  yes. Status EXAM upon discharge:  Status and Exam  Normal: No  Facial Expression: Exaggerated  Affect: Unstable  Level of Consciousness: Alert  Mood:Normal: No  Mood: Labile  Motor Activity:Normal: Yes  Interview Behavior: Cooperative, Evasive, Irritable  Preception: Channing to Person, González Mention to Time, Channing to Place  Attention:Normal: No  Attention: Distractible  Thought Processes: Flt.of Ideas, Tangential  Thought Content:Normal: No  Thought Content: Preoccupations, Delusions, Paranoia  Hallucinations: None (pt denies)  Delusions: Yes  Delusions: Persecution  Memory:Normal: No  Memory: Confabulation, Poor Recent, Poor Remote  Insight and Judgment: No  Insight and Judgment: Poor Judgment, Poor Insight, Unrealistic  Present Suicidal Ideation: No  Present Homicidal Ideation: No      Metabolic Screening:    No results found for: LABA1C    No results found for: CHOL  No results found for: TRIG  No results found for: HDL  No components found for: LDLCAL  No results found for: LABVLDL     Patient discharged to his car that is parked at [facility] ED with all belongings via hospitality cab. Patient denies thoughts of self harm and voices readiness for discharge.     Radha Moreno RN

## 2021-08-17 NOTE — PROGRESS NOTES
21:20 Report to [facility]. Transferred to RN for medical concerns. .  23:30  RN Noel Espana given original [facility] requesting [facility] be written to [facility]. Copy of [facility] is faxed to [facility]. Patient is accepted to [facility]. ER staff updated on plan of care.

## 2021-08-17 NOTE — BH NOTE
Patient given tobacco quitline number 90970203499 at this time, refusing to call at this time, states \" I just dont want to quit now\"- patient given information as to the dangers of long term tobacco use. Continue to reinforce the importance of tobacco cessation.

## 2021-08-17 NOTE — GROUP NOTE
Group Therapy Note    Date: 8/17/2021    Group Start Time: 1330  Group End Time: 3997  Group Topic: Recreational    ST[facility] ANGUS Espinoza, [facility]    Pt did not attend recreational group d/t resting in room despite staff invitation to attend. 1:1 talk time offered as alternative to group session, pt declined.             Signature:  Norberto Osorio

## 2021-08-17 NOTE — ED NOTES
Patient resting in bed. Respirations easy and unlabored. No distress noted. Call light within reach.        Ella Alfaro RN  08/17/21 3860

## 2021-08-17 NOTE — ED NOTES
Patient resting in bed. Respirations easy and unlabored. No distress noted. Call light within reach.        Jeff Haynes RN  08/16/21 1083

## 2021-08-17 NOTE — BH NOTE
[address] [facility]  Admission Note     Admission Type:   Admission Type: Involuntary    Reason for admission:  Reason for Admission: brought to ED by police, paranoid delusions    PATIENT STRENGTHS:  Strengths: Communication    Patient Strengths and Limitations:  Limitations: Difficulty problem solving/relies on others to help solve problems    Addictive Behavior:   Addictive Behavior  In the past 3 months, have you felt or has someone told you that you have a problem with:  : None  Do you have a history of Chemical Use?: No  Do you have a history of Alcohol Use?: No  Do you have a history of Street Drug Abuse?: Yes  Histroy of Prescripton Drug Abuse?: No    Medical Problems:   History reviewed. No pertinent past medical history. Status EXAM:  Status and Exam  Normal: No  Facial Expression: Exaggerated  Affect: Unstable  Level of Consciousness: Alert  Mood:Normal: No  Mood: Anxious  Motor Activity:Normal: Yes  Interview Behavior: Cooperative  Attention:Normal: No  Attention: Distractible  Thought Processes: Flt.of Ideas, Tangential  Thought Content:Normal: No  Thought Content: Paranoia, Delusions  Hallucinations: None  Delusions: Yes  Delusions: Persecution  Memory:Normal: No  Memory: Confabulation  Insight and Judgment: No  Insight and Judgment: Poor Judgment, Poor Insight  Present Suicidal Ideation: No  Present Homicidal Ideation: No    Tobacco Screening:  Practical Counseling, on admission, lalo X, if applicable and completed (first 3 are required if patient doesn't refuse):             (x )  Recognizing danger situations (included triggers and roadblocks)                    ( x)  Coping skills (new ways to manage stress, exercise, relaxation techniques, changing routine, distraction)                                                           ( x)  Basic information about quitting (benefits of quitting, techniques in how to quit, available resources  ( ) [facility]                                           ( ) Patient refused counseling  ( ) Patient has not smoked in the last 30 days    Metabolic Screening:    No results found for: LABA1C    No results found for: CHOL  No results found for: TRIG  No results found for: HDL  No components found for: LDLCAL  No results found for: LABVLDL      Body mass index is 58.44 kg/m². BP Readings from Last 2 Encounters:   08/17/21 (!) 159/100   08/17/21 138/76           Pt admitted with followings belongings:  Dentures: None  Vision - Corrective Lenses: Glasses  Hearing Aid: None  Jewelry: None  Body Piercings Removed: N/A  Clothing: Pants, Shirt, Socks, Undergarments (Comment)  Were All Patient Medications Collected?: Not Applicable  Other Valuables: Cell phone (see belonging sheet)     Patient's home medications were n/a. Patient oriented to surroundings and program expectations and copy of patient rights given. Received admission packet:  yes. Consents reviewed, signed yes. Refused nothing. Patient verbalize understanding:  yes. Patient education on precautions: yes.                     Alice Peabody, RN

## 2021-08-17 NOTE — PLAN OF CARE
[address] [facility]  Initial Interdisciplinary Treatment Plan NO      Original treatment plan Date & Time: 8/17/2021  0802    Admission Type:  Admission Type: Involuntary    Reason for admission:   Reason for Admission: brought to ED by police, paranoid delusions    Estimated Length of Stay:  5-7days  Estimated Discharge Date: to be determined by physician    PATIENT STRENGTHS:  Patient Strengths:Strengths: Communication  Patient Strengths and Limitations:Limitations: Difficulty problem solving/relies on others to help solve problems  Addictive Behavior: Addictive Behavior  In the past 3 months, have you felt or has someone told you that you have a problem with:  : None  Do you have a history of Chemical Use?: No  Do you have a history of Alcohol Use?: No  Do you have a history of Street Drug Abuse?: Yes  Histroy of Prescripton Drug Abuse?: No  Medical Problems:History reviewed. No pertinent past medical history.   Status EXAM:Status and Exam  Normal: No  Facial Expression: Exaggerated  Affect: Unstable  Level of Consciousness: Alert  Mood:Normal: No  Mood: Anxious  Motor Activity:Normal: Yes  Interview Behavior: Cooperative  Attention:Normal: No  Attention: Distractible  Thought Processes: Flt.of Ideas, Tangential  Thought Content:Normal: No  Thought Content: Paranoia, Delusions  Hallucinations: None  Delusions: Yes  Delusions: Persecution  Memory:Normal: No  Memory: Confabulation  Insight and Judgment: No  Insight and Judgment: Poor Judgment, Poor Insight  Present Suicidal Ideation: No  Present Homicidal Ideation: No    EDUCATION:   Learner Progress Toward Treatment Goals: reviewed group plans and strategies for care    Method:group therapy, medication compliance, individualized assessments and care planning    Outcome: needs reinforcement    PATIENT GOALS: to be discussed with patient within 72 hours    PLAN/TREATMENT RECOMMENDATIONS:     continue group therapy , medications compliance, goal setting, individualized assessments and care, continue to monitor pt on unit      SHORT-TERM GOALS:   Time frame for Short-Term Goals: 5-7 days    LONG-TERM GOALS:  Time frame for Long-Term Goals: 6 months  Members Present in Team Meeting: See Signature Sheet    ROSA Angelo Casey

## 2021-08-17 NOTE — PROGRESS NOTES
Pharmacy Medication History Note      List of current medications patient is taking is complete. [facility]    Changes made to medication list:  Medications removed (include reason, ex. therapy complete or physician discontinued, noncompliance):  None     Patient denies taking any prescription medications. There is no fill history within [facility]. Please let me know if you have any questions about this encounter. Thank you!     Electronically signed by Parrish Singh, [address] [place] [address] on 8/17/2021 at 8:18 AM

## 2021-08-17 NOTE — ED NOTES
Patient resting in bed. Respirations easy and unlabored. No distress noted. Call light within reach.        Lindsay Taylor RN  08/17/21 3166

## 2021-08-17 NOTE — GROUP NOTE
Group Therapy Note    Date: 8/17/2021    Group Start Time: 1100  Group End Time: 1494  Group Topic: Cognitive Skills    [facility] [place] [facility]        Group Therapy Note    Attendees: 5/21         Patient's Goal:  To demonstrate improved coping skills     Notes:  Patient was pleasant but did not interact with peers    Status After Intervention:  unchanged    Participation Level:  Active Listener   Participation Quality: minimal       Speech:  normal      Thought Process/Content:delusional       Affective Functioning: animated at times      Mood: superficial      Level of consciousness:  Alert,       Response to Learning: Able to verbalize current knowledge/experience, Able to verbalize/acknowledge new learning, Capable of insight and Progressing to goal      Endings: None Reported    Modes of Intervention: Education, Support, Socialization, Exploration, Clarifying and Problem-solving      Discipline Responsible: Psychoeducational Specialist      Signature:  Maggie Gaston

## 2021-08-17 NOTE — ED NOTES
Patient resting in bed. Respirations easy and unlabored. No distress noted. Call light within reach. Patient remains in ligature resistant room under constant supervision.        Alessio Levy RN  08/17/21 3818
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
## 2021-08-17 NOTE — ED NOTES
Patient resting in bed. Respirations easy and unlabored. No distress noted. Call light within reach.        Deepa Gonzalez RN  08/17/21 [id number]

## 2021-08-17 NOTE — ED NOTES
Patient resting in bed. Respirations easy and unlabored. No distress noted.  Pt remains under staff supervision at this time       Evans Fraire RN  08/17/21 0045

## 2021-08-17 NOTE — CARE COORDINATION
SW met with patient to discuss his discharge plans. Patient stated he does not have any mental health problems and does not need any follow up appointment scheduled with him in the community. 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  Talked with patient's physician who agreed with the plan. Regarding a safety plan with patient, he was not agreeable to completing this as well.

## 2021-08-18 LAB
ORGANISM: ABNORMAL
ORGANISM: ABNORMAL
URINE CULTURE REFLEX: ABNORMAL
URINE CULTURE REFLEX: ABNORMAL

## 2021-08-18 NOTE — ED PROVIDER NOTES
[facility]  eMERGENCY dEPARTMENT eNCOUnter     Pt Name: Kamron Lamb  MRN: [id number]  Ashleygfshilo 1995  Date of evaluation: 8/18/21        Mid-level provider Note:    I have personally performed and/or participated in the history, exam and medical decision making and agree with all pertinent clinical information as noted by the previous provider. I have also reviewed and agree with the past medical, family and social history unless otherwise noted. I have personally performed a face to face diagnostic evaluation on this patient. I have reviewed the previous provider's findings and agree. Evaluation: Patient handed off to me via [facility], awaiting placement outside facility, in the meantime patient is noted to have UTI, treated with fosfomycin. By the end of my shift patient continued to be awaiting placement, patient was handed off to [facility] awaiting placement and transfer to outside psychiatric facility. 1. Acute psychosis ([facility] [address]    2. Delusional disorder ([facility] [facility]          DISPOSITION/PLAN  PATIENT REFERRED TO:  No follow-up provider specified. DISCHARGE MEDICATIONS:  There are no discharge medications for this patient.         Sherry Holloway. Irvin, APRN - CNP      [employer], APRBUZZ - CNP  08/18/21 2858

## 2021-09-02 ENCOUNTER — HOSPITAL ENCOUNTER (EMERGENCY)
Facility: MEDICAL CENTER | Age: 26
End: 2021-09-02

## 2021-09-02 VITALS
HEART RATE: 85 BPM | SYSTOLIC BLOOD PRESSURE: 153 MMHG | OXYGEN SATURATION: 95 % | TEMPERATURE: 97.5 F | DIASTOLIC BLOOD PRESSURE: 100 MMHG | BODY MASS INDEX: 65.27 KG/M2 | RESPIRATION RATE: 18 BRPM | WEIGHT: 315 LBS

## 2021-09-02 PROCEDURE — 302449 STATCHG TRIAGE ONLY (STATISTIC)

## 2022-04-13 ENCOUNTER — APPOINTMENT (OUTPATIENT)
Dept: RADIOLOGY | Facility: MEDICAL CENTER | Age: 27
End: 2022-04-13
Attending: EMERGENCY MEDICINE

## 2022-04-13 ENCOUNTER — HOSPITAL ENCOUNTER (EMERGENCY)
Facility: MEDICAL CENTER | Age: 27
End: 2022-04-13
Attending: EMERGENCY MEDICINE
Payer: OTHER MISCELLANEOUS

## 2022-04-13 VITALS
DIASTOLIC BLOOD PRESSURE: 67 MMHG | TEMPERATURE: 97 F | WEIGHT: 315 LBS | SYSTOLIC BLOOD PRESSURE: 144 MMHG | OXYGEN SATURATION: 94 % | HEART RATE: 75 BPM | BODY MASS INDEX: 39.17 KG/M2 | RESPIRATION RATE: 18 BRPM | HEIGHT: 75 IN

## 2022-04-13 DIAGNOSIS — R06.00 DYSPNEA, UNSPECIFIED TYPE: ICD-10-CM

## 2022-04-13 PROCEDURE — 700102 HCHG RX REV CODE 250 W/ 637 OVERRIDE(OP): Performed by: EMERGENCY MEDICINE

## 2022-04-13 PROCEDURE — 99283 EMERGENCY DEPT VISIT LOW MDM: CPT

## 2022-04-13 PROCEDURE — A9270 NON-COVERED ITEM OR SERVICE: HCPCS | Performed by: EMERGENCY MEDICINE

## 2022-04-13 PROCEDURE — U0003 INFECTIOUS AGENT DETECTION BY NUCLEIC ACID (DNA OR RNA); SEVERE ACUTE RESPIRATORY SYNDROME CORONAVIRUS 2 (SARS-COV-2) (CORONAVIRUS DISEASE [COVID-19]), AMPLIFIED PROBE TECHNIQUE, MAKING USE OF HIGH THROUGHPUT TECHNOLOGIES AS DESCRIBED BY CMS-2020-01-R: HCPCS

## 2022-04-13 PROCEDURE — 71045 X-RAY EXAM CHEST 1 VIEW: CPT

## 2022-04-13 PROCEDURE — U0005 INFEC AGEN DETEC AMPLI PROBE: HCPCS

## 2022-04-13 RX ORDER — ALBUTEROL SULFATE 90 UG/1
2 AEROSOL, METERED RESPIRATORY (INHALATION) ONCE
Status: COMPLETED | OUTPATIENT
Start: 2022-04-13 | End: 2022-04-13

## 2022-04-13 RX ORDER — ALBUTEROL SULFATE 90 UG/1
2 AEROSOL, METERED RESPIRATORY (INHALATION) EVERY 6 HOURS PRN
Qty: 8.5 G | Refills: 0 | Status: SHIPPED | OUTPATIENT
Start: 2022-04-13

## 2022-04-13 RX ADMIN — ALBUTEROL SULFATE 2 PUFF: 90 AEROSOL, METERED RESPIRATORY (INHALATION) at 21:16

## 2022-04-13 ASSESSMENT — ENCOUNTER SYMPTOMS
FEVER: 0
CHILLS: 1
SHORTNESS OF BREATH: 1

## 2022-04-14 LAB
SARS-COV-2 RNA RESP QL NAA+PROBE: NOTDETECTED
SPECIMEN SOURCE: NORMAL

## 2022-04-14 ASSESSMENT — ENCOUNTER SYMPTOMS
NAUSEA: 0
VOMITING: 0
ABDOMINAL PAIN: 0

## 2022-04-14 NOTE — ED TRIAGE NOTES
"Chief Complaint   Patient presents with   • Flu Like Symptoms     \"it feels like I am drowning when I sleep\", states symptoms have been going on for a month.  Sneezing, coughing, N/V/D   • Foot Problem     States has fungal infection   • Shortness of Breath       Pt ambulated to triage. Pt A&Ox4, came in for above complaint. States \"my mom feels like I am neglecting myself and I just need to get checked out\"    Pt to lobby . Pt educated on alerting staff in changes to condition. Pt verbalized understanding.     BP (!) 164/99   Pulse 82   Temp (!) 35.7 °C (96.2 °F) (Temporal)   Resp 18   Ht 1.905 m (6' 3\")   Wt (!) 190 kg (419 lb 15.6 oz)   SpO2 98%   BMI 52.49 kg/m²     "

## 2022-04-14 NOTE — ED NOTES
"Pt discharged home, pt A&Ox4, on room air ,steady gait. Pt educated on worsening s/s, pt verbalized understanding.  pt in possession of belongings. Pt provided discharge education and information pertaining to medications and follow up appointments. Pt received copy of discharge instructions and verbalized understanding. /67   Pulse 75   Temp 36.1 °C (97 °F) (Temporal)   Resp 18   Ht 1.905 m (6' 3\")   Wt (!) 190 kg (419 lb 15.6 oz)   SpO2 94%   BMI 52.49 kg/m²   "

## 2022-04-14 NOTE — DISCHARGE INSTRUCTIONS
Your shortness of breath has been ongoing for months, as I discussed this is likely seasonal allergies, you can take a daily Zyrtec and use albuterol inhaler as prescribed

## 2022-04-14 NOTE — ED PROVIDER NOTES
"ED Provider Note    Scribed for Cristal Leo M.D. by Selena Fernandez. 4/13/2022, 8:56 PM.    Primary care provider: Pcp Pt States None  Means of arrival: Walk-in  History obtained from: Patient  History limited by: None    CHIEF COMPLAINT  Chief Complaint   Patient presents with   • Flu Like Symptoms     \"it feels like I am drowning when I sleep\", states symptoms have been going on for a month.  Sneezing, coughing, N/V/D   • Foot Problem     States has fungal infection   • Shortness of Breath       HPI  Rick Smith is a 26 y.o. male who presents to the Emergency Department for shortness of breath onset 1-2 months ago. He had associated chills. Patient is concerned for COVID-19 and is requesting to be tested.  He denies fevers, nausea, vomiting or abdominal pain.     Patient has additional complaints of a fungal infection to bilateral feet.  He reports that his feet are sore, dry and itchy.  He was seen at [facility] and was prescribed antifungal cream, but he has not started using this yet as he has not yet picked up the prescription.    REVIEW OF SYSTEMS  Review of Systems   Constitutional: Positive for chills. Negative for fever.   Respiratory: Positive for shortness of breath.    Cardiovascular: Negative for chest pain.   Gastrointestinal: Negative for abdominal pain, nausea and vomiting.   Skin:        Positive for fungal infection to bilateral feet        PAST MEDICAL HISTORY   has a past medical history of Hypertension.    SURGICAL HISTORY  patient denies any surgical history    SOCIAL HISTORY  Social History     Tobacco Use   • Smoking status: Current Every Day Smoker     Packs/day: 0.25     Years: 1.00     Pack years: 0.25     Types: Cigarettes   • Smokeless tobacco: Never Used   Vaping Use   • Vaping Use: Some days   • Substances: Nicotine   Substance Use Topics   • Alcohol use: Yes     Comment: occ   • Drug use: Yes     Types: Marijuana, Inhaled, Oral     Comment: chandrika      Social History " "    Substance and Sexual Activity   Drug Use Yes   • Types: Marijuana, Inhaled, Oral    Comment: chandrika       FAMILY HISTORY  History reviewed. No pertinent family history.    CURRENT MEDICATIONS  Home Medications     Reviewed by Shanique Cobb R.N. (Registered Nurse) on 04/13/22 at 2020  Med List Status: Complete   Medication Last Dose Status        Patient Reynaldo Taking any Medications                        ALLERGIES  No Known Allergies    PHYSICAL EXAM  VITAL SIGNS: BP (!) 164/99   Pulse 82   Temp (!) 35.7 °C (96.2 °F) (Temporal)   Resp 18   Ht 1.905 m (6' 3\")   Wt (!) 190 kg (419 lb 15.6 oz)   SpO2 98%   BMI 52.49 kg/m²   Vitals reviewed by myself.  Physical Exam  Nursing note and vitals reviewed.  Constitutional: Well-developed and well-nourished. No acute distress.   HENT: Head is normocephalic and atraumatic.  Eyes: extra-ocular movements intact  Cardiovascular: regular rate and regular rhythm. No murmur heard.  Pulmonary/Chest: Breath sounds normal. No wheezes or rales.   Abdominal: Soft and non-tender. No distention.    Musculoskeletal: Extremities exhibit normal range of motion without edema or tenderness.   Neurological: Awake and alert  Skin: Skin is warm and dry. No rash. Bilateral feet with calluses, no erythema  or open wounds    DIAGNOSTIC STUDIES  LABS  Labs Reviewed   SARS-COV-2, PCR (IN-HOUSE)     All labs reviewed by me.    RADIOLOGY  DX-CHEST-PORTABLE (1 VIEW)   Final Result      No acute cardiopulmonary abnormality.           The radiologist's interpretation of all radiological studies have been reviewed by me.    REASSESSMENT    8:56 PM - Patient seen and examined at bedside. Discussed plan of care, including labs, chest x-ray, and COVID test. Patient agrees to the plan of care.     10:07 PM - Patient was reevaluated at bedside. Patient was sleeping upon entering the room with a normal oxygen saturation. Discussed radiology results with the patient. I informed them the COVID test " will result in around 24 hours and advised him to self isolate until he has the results. Recommended staying hydrated and resting. Informed the patient that if his test results as positive he will need to isolate for 10 days past the onset of symptoms. Prescribed albuterol. Discussed return precautions. Patient will be discharged at this time. Parent/Guardian verbalizes agreement with discharge and plan of care.    COURSE & MEDICAL DECISION MAKING  Nursing notes, VS, PMSFHx reviewed in chart.    Patient is a 26-year-old male who comes in for evaluation of shortness of breath for a couple of months.  Differential diagnoses include seasonal allergies, viral syndrome, pneumonia.  Diagnostic work-up includes chest x-ray.  Regarding his foot foot discomfort I advised patient on the importance of good foot hygiene and the importance of picking up his prescriptions for his athlete's foot.    Patient's initial vitals are within normal limits.  He is treated with albuterol after which he feels improved.  Chest x-ray returns and demonstrates no acute cardiopulmonary processes.  Therefore patient is reassured and given strict return precautions.  Patient is then discharged in stable condition.    The patient will return for new or worsening symptoms and is stable at the time of discharge.    DISPOSITION:  Patient will be discharged home in stable condition.    OUTPATIENT MEDICATIONS:  Discharge Medication List as of 4/13/2022 10:24 PM      START taking these medications    Details   albuterol 108 (90 Base) MCG/ACT Aero Soln inhalation aerosol Inhale 2 Puffs every 6 hours as needed for Shortness of Breath., Disp-8.5 g, R-0, Normal             FINAL IMPRESSION  1. Dyspnea, unspecified type          I, Selena Fernandez (Klarissa), am scribing for, and in the presence of, Cristal Leo M.D..    Electronically signed by: Selena Roach), 4/13/2022    ICristal M.D. personally performed the services described in this  documentation, as scribed by Selena Fernandez in my presence, and it is both accurate and complete.     The note accurately reflects work and decisions made by me.  Cristal Leo M.D.  4/14/2022  1:43 AM     coccyx

## 2022-04-20 ENCOUNTER — NON-PROVIDER VISIT (OUTPATIENT)
Dept: OCCUPATIONAL MEDICINE | Facility: CLINIC | Age: 27
End: 2022-04-20
Payer: OTHER MISCELLANEOUS

## 2022-04-20 DIAGNOSIS — Z02.1 PRE-EMPLOYMENT DRUG SCREENING: ICD-10-CM

## 2022-04-20 LAB
AMP AMPHETAMINE: NORMAL
COC COCAINE: NORMAL
INT CON NEG: NORMAL
INT CON POS: NORMAL
MET METHAMPHETAMINES: NORMAL
OPI OPIATES: NORMAL
PCP PHENCYCLIDINE: NORMAL
POC DRUG COMMENT 753798-OCCUPATIONAL HEALTH: NORMAL
THC: NORMAL

## 2022-04-20 PROCEDURE — 80305 DRUG TEST PRSMV DIR OPT OBS: CPT | Performed by: PREVENTIVE MEDICINE

## 2022-04-21 ENCOUNTER — HOSPITAL ENCOUNTER (EMERGENCY)
Facility: MEDICAL CENTER | Age: 27
End: 2022-04-21
Attending: EMERGENCY MEDICINE
Payer: OTHER MISCELLANEOUS

## 2022-04-21 VITALS
DIASTOLIC BLOOD PRESSURE: 102 MMHG | HEART RATE: 71 BPM | BODY MASS INDEX: 51.64 KG/M2 | OXYGEN SATURATION: 100 % | RESPIRATION RATE: 16 BRPM | SYSTOLIC BLOOD PRESSURE: 154 MMHG | TEMPERATURE: 96.8 F | WEIGHT: 315 LBS

## 2022-04-21 DIAGNOSIS — F22 PARANOIA (HCC): ICD-10-CM

## 2022-04-21 DIAGNOSIS — F23 ACUTE PSYCHOSIS (HCC): ICD-10-CM

## 2022-04-21 DIAGNOSIS — E66.01 MORBID OBESITY (HCC): ICD-10-CM

## 2022-04-21 LAB
AMPHET UR QL SCN: NEGATIVE
BARBITURATES UR QL SCN: NEGATIVE
BENZODIAZ UR QL SCN: NEGATIVE
BZE UR QL SCN: NEGATIVE
CANNABINOIDS UR QL SCN: POSITIVE
METHADONE UR QL SCN: NEGATIVE
OPIATES UR QL SCN: NEGATIVE
OXYCODONE UR QL SCN: NEGATIVE
PCP UR QL SCN: NEGATIVE
POC BREATHALIZER: 0 PERCENT (ref 0–0.01)
PROPOXYPH UR QL SCN: NEGATIVE

## 2022-04-21 PROCEDURE — 99285 EMERGENCY DEPT VISIT HI MDM: CPT

## 2022-04-21 PROCEDURE — 90791 PSYCH DIAGNOSTIC EVALUATION: CPT

## 2022-04-21 PROCEDURE — 302970 POC BREATHALIZER

## 2022-04-21 PROCEDURE — 80307 DRUG TEST PRSMV CHEM ANLYZR: CPT

## 2022-04-21 RX ORDER — ALBUTEROL SULFATE 90 UG/1
2 AEROSOL, METERED RESPIRATORY (INHALATION) EVERY 6 HOURS PRN
Status: DISCONTINUED | OUTPATIENT
Start: 2022-04-21 | End: 2022-04-21 | Stop reason: HOSPADM

## 2022-04-21 RX ORDER — OLANZAPINE 5 MG/1
10 TABLET, ORALLY DISINTEGRATING ORAL EVERY EVENING
Status: DISCONTINUED | OUTPATIENT
Start: 2022-04-21 | End: 2022-04-21

## 2022-04-21 RX ORDER — OLANZAPINE 5 MG/1
10 TABLET, ORALLY DISINTEGRATING ORAL DAILY
Status: DISCONTINUED | OUTPATIENT
Start: 2022-04-21 | End: 2022-04-21

## 2022-04-21 NOTE — ED TRIAGE NOTES
".Rick Smith  .  Chief Complaint   Patient presents with   • Psych Eval     Patient AYE EVANS with above complaint. [place], states \"the cartel is after me and those britches going to put a hole in me\". Patient having conversation with self in triage. While in lobby patient reports SI thoughts after being told by ED tech that he could not have any water or food til seen by ERP.     Patient low risk.   Patient to green 32.  Report given to THOM Hughes.  "

## 2022-04-21 NOTE — ED PROVIDER NOTES
ED Provider Note    CHIEF COMPLAINT  Chief Complaint   Patient presents with   • Psych Eval       HPI    Primary care provider: None  Means of arrival: EMS  History obtained from: Patient  History limited by: Patient altered    Rick Smith is a 26 y.o. male who presents with concern for psychiatric illness.  The patient is very paranoid he thinks the [place] mafia is following him, unclear where he was transported from but 911 was apparently called, with concern for aggressive behavior and paranoid delusions.  He is asking to see a psychiatrist.  He denies taking any medications.    Further history limited, patient quite altered and appears to be responding to internal stimuli.    REVIEW OF SYSTEMS  Psychiatric/Behavioral: Positive for paranoia and responding to internal stimuli.  Further ROS cannot be obtained, patient altered appears psychotic.      PAST MEDICAL HISTORY   has a past medical history of Hypertension.    PAST FAMILY HISTORY  Unknown, cannot obtain, patient appears psychotic.    SOCIAL HISTORY  Social History     Tobacco Use   • Smoking status: Current Every Day Smoker     Packs/day: 0.25     Years: 1.00     Pack years: 0.25     Types: Cigarettes   • Smokeless tobacco: Never Used   Vaping Use   • Vaping Use: Some days   • Substances: Nicotine   Substance and Sexual Activity   • Alcohol use: Yes     Comment: occ   • Drug use: Yes     Types: Marijuana, Inhaled, Oral     Comment: chandrika   • Sexual activity: Not on file       SURGICAL HISTORY  patient denies any surgical history    CURRENT MEDICATIONS  Patient denies any daily meds.    ALLERGIES  No Known Allergies    PHYSICAL EXAM  VITAL SIGNS: /91   Pulse 75   Temp 36.1 °C (97 °F) (Temporal)   Resp 18   Wt (!) 187 kg (413 lb 2.3 oz)   SpO2 97%   BMI 51.64 kg/m²    Pulse ox interpretation: On room air, I interpret this pulse ox as normal.  Constitutional: Well-developed, sitting up, anxious appearing.  HEENT: Normocephalic,  atraumatic. Posterior pharynx clear, mucous membranes dry.  Eyes:  EOMI. Normal sclerae.  Neck: Supple, nontender.  Chest/Pulmonary: Diminished to ausculation bilaterally, no wheezes or rhonchi.  Cardiovascular: Regular rate and rhythm, no murmur.   Abdomen: Soft, nontender.  Back: No CVA or midline tenderness.   Musculoskeletal: No deformity or edema.  Neuro: Clear speech, alert, no focal weakness or asymmetry.  Psych: Appears very anxious, paranoid delusions, responding to internal stimuli.  Skin: No rashes, warm and dry.      DIAGNOSTIC STUDIES / PROCEDURES    LABS & EKG  Results for orders placed or performed during the hospital encounter of 04/21/22   URINE DRUG SCREEN   Result Value Ref Range    Amphetamines Urine Negative Negative    Barbiturates Negative Negative    Benzodiazepines Negative Negative    Cocaine Metabolite Negative Negative    Methadone Negative Negative    Opiates Negative Negative    Oxycodone Negative Negative    Phencyclidine -Pcp Negative Negative    Propoxyphene Negative Negative    Cannabinoid Metab Positive (A) Negative   POC BREATHALIZER   Result Value Ref Range    POC Breathalizer 0.00 0.00 - 0.01 Percent         COURSE & MEDICAL DECISION MAKING    This is a 26 y.o. male who presents with paranoia, concern for psychosis.    Differential Diagnosis includes but is not limited to:  Psychosis, intoxication, ingestion, mental illness    ED Course:  Unfortunate 26-year-old male coming in with above concerning presentation.  Appears psychotic responding to internal stimuli, I do not think he has the ability to care for himself, so I placed an involuntary mental health hold.  Tox screen negative aside from cannabis.  Not intoxicated with alcohol.  Vital signs normal.  As such, I think the patient needs inpatient mental health stabilization, behavioral health team consulted, and the patient will be observed under psychiatric precautions until he may be transferred to a mental health facility  for higher level of acute psychiatric care.    Medications   OLANZapine zydis (ZYPREXA TBDP) disintegrating tablet 10 mg (has no administration in time range)       FINAL IMPRESSION  1. Acute psychosis (HCC)    2. Morbid obesity (HCC)    3. Paranoia (HCC)        -ADMIT-      Pertinent Lab studies reviewed and verified by myself, as well as nursing notes and the patient's past medical, family, and social histories (See chart for details).    Portions of this record were made with voice recognition software.  Despite my review, spelling/grammar/context errors may still remain.  Interpretation of this chart should be taken in this context.    Electronically signed by Javed Hale M.D. on 4/21/2022 at 2:34 PM.

## 2022-04-22 NOTE — DISCHARGE INSTRUCTIONS
Follow-up with one of the local resources that has been provided for you this evening.  Take your medications daily as directed

## 2022-04-22 NOTE — ED NOTES
Certificate of release signed by Kristan Adame DO. Pt no longer on legal hold. Pt asking for shower, pt given clothes and bags, backpack as well as some washcloths for shower.    
ERP at bedside.   
Med rec completed   Allergies reviewed    
Patient's home medications have been reviewed by the pharmacy team.     Past Medical History:   Diagnosis Date   • Hypertension        Patient's Medications   New Prescriptions    No medications on file   Previous Medications    ALBUTEROL 108 (90 BASE) MCG/ACT AERO SOLN INHALATION AEROSOL    Inhale 2 Puffs every 6 hours as needed for Shortness of Breath.   Modified Medications    No medications on file   Discontinued Medications    No medications on file          A:  Medications do not appear to be contributing to current complaints.       P:    No recommendations at this time.   Home medications have been reordered as appropriate.        Kelsi Lobato, PharmD            
Pt ambulated to lobby with all belongings  
Pt ambulatory to restroom.   
Pt belongings placed in to locker 12, back pack outside locker as it will not fit. 2 belongings bags in locker.   
Pt pacing in room, talking to camera on the ceiling. Currently cooperative.   
Pt placed on a legal hold. Belongings collected and out of room.   
Pt refusing PO zyprexa. Pt calm. ERP aware. Pt given hot meal. 15 min close obs in place.    Erica with alert team at bedside.   
Pt resting and denies needs at this time. 15 min close obs in place.    
Pt resting and given hot meal. 15 min close obs in place. VSS.  
Pt resting and repositioning self. 15 min close obs in place.    
Pt resting and sleeping. ERP aware PO zyprexa has been held for now.   
Pt resting with even and unlabored breathing. 15 min close obs in place.  
lightheadedness, LOC, L leg pain

## 2022-04-22 NOTE — CONSULTS
"[facility]   TRIAGE ASSESSMENT    Name: Rick Smith  MRN: [id number]  : 1995  Age: 26 y.o.  Date of assessment: 2022  PCP: Pcp Pt States None  Persons in attendance: Patient  Patient Location: [facility]    CHIEF COMPLAINT/PRESENTING ISSUE (as stated by patient): 26 year old male BIB EMS today, legal hold, disorganized thoughts, behaviors, pt alert, oriented x 4; guarded; resistant to answer some evaluation questions d/t paranoia r/t the the \"cartel\" being after him and \"homeland security\" and \"state department\" employees that get him \"fired\" from his jobs; loose associations; internally preoccupied, talking to himself, asking if the camera in his room is recording him; states he has been in [place] from [place] and travelling the [facility] since last year; states he travelled from [place] to [facility] and then here; no Si, HI, or self-harm ideation noted; able to meet his basic needs; insight, judgment adequate; with noted h/o chronic paranoia and delusions during and ED visit at 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, [place] [address] address is noted on pt's face sheet    BEHAVIORAL HEALTH/SUBSTANCE USE TREATMENT HISTORY  Does patient/parent report a history of prior behavioral health/substance use treatment for patient?   Yes:    Dates Level of Care Facilty/Provider Diagnosis/Problem Medications   8/2021 inpt MH MH tx in [place], [place]  Acute Psychosis and Delusional D/O          SAFETY ASSESSMENT - SELF  Does patient acknowledge current or past symptoms of dangerousness to self or is previous history noted? no  Does parent/significant other report patient has current or past symptoms of dangerousness to self? N\A  Does presenting problem suggest symptoms of dangerousness to self? No    SAFETY ASSESSMENT - OTHERS  Does patient acknowledge current or past symptoms of aggressive behavior or risk to others or is previous history noted? no  Does parent/significant other report patient has current or past symptoms of aggressive behavior or risk to others?  N\A  Does presenting problem suggest symptoms of dangerousness to others? No    LEGAL HISTORY  Does patient acknowledge history of arrest/senior care/MCFP or is previous history noted? no    Crisis Safety Plan completed and copy given to patient? N\A    ABUSE/NEGLECT SCREENING  Does patient report feeling “unsafe” in his/her home, or afraid of anyone?  No-chronic, fixed paranoia r/t the \"cartel\" and \"homeland security\"  Does patient report any history of physical, sexual, or emotional abuse?  no  Does parent or significant other report any of the above? N\A  Is there evidence of neglect by self?  no  Is there evidence of neglect by a caregiver? no  Does the patient/parent report any " "history of CPS/APS/police involvement related to suspected abuse/neglect or domestic violence? no  Based on the information provided during the current assessment, is a mandated report of suspected abuse/neglect being made?  No    SUBSTANCE USE SCREENING  Yes:  Louis all substances used in the past 30 days:      Last Use Amount   []   Alcohol     [x]   Marijuana 4/16/22 occassionally   []   Heroin     []   Prescription Opioids  (used without prescription, for    recreation, or in excess of prescribed amount)     []   Other Prescription  (used without prescription, for    recreation, or in excess of prescribed amount)     []   Cocaine      []   Methamphetamine     []   \"\" drugs (ectasy, MDMA)     []   Other substances        UDS results: negative  Breathalyzer results: + THC    What consequences does the patient associate with any of the above substance use and or addictive behaviors? None    Risk factors for detox (check all that apply):  []  Seizures   []  Diaphoretic (sweating)   []  Tremors   []  Hallucinations   []  Increased blood pressure   []  Decreased blood pressure   []  Other   [x]  None      [] Patient education on risk factors for detoxification and instructed to return to ER as needed.      MENTAL STATUS   Participation: Limited verbal participation, Guarded and Resistant  Grooming: Casual and Neat  Orientation: Alert, Fully Oriented and Evidence of delusions present  Behavior: Calm  Eye contact: Good  Mood: Euthymic  Affect: Flexible and Full range  Thought process: Goal-directed, Circumstantial and Perseveration  Thought content: Preoccupation, Evidence of delusion and Paranoia  Speech: Rate within normal limits and Volume within normal limits  Perception: Derealization  Memory:  No gross evidence of memory deficits  Insight: Adequate  Judgment:  Adequate  Other:    Collateral information:   Source:  [] Significant other present in person:   [] Significant other by telephone  [] [facility] Social " Worker  [x] [facility] Nursing Staff  [x] [facility]  [] Other:     [] Unable to complete full assessment due to:  [] Acute intoxication  [] Patient declined to participate/engage  [] Patient verbally unresponsive  [] Significant cognitive deficits  [] Significant perceptual distortions or behavioral disorganization  [] Other:      CLINICAL IMPRESSIONS:  Primary:  Chronic delusional disorder by hstory  Secondary:  Possibly homeless       IDENTIFIED NEEDS/PLAN:  [Trigger DISPOSITION list for any items marked]    []  Imminent safety risk - self [] Imminent safety risk - others   []  Acute substance withdrawal []  Psychosis/Impaired reality testing   []  Mood/anxiety []  Substance use/Addictive behavior   []  Maladaptive behaviro []  Parent/child conflict   []  Family/Couples conflict []  Biomedical   []  Housing [x]  Financial   []   Legal  Occupational/Educational   []  Domestic violence []  Other:     Recommended Plan of Care:  Refer to [facility], [facility], [facility]); no active insurance plan; writer RN reviewed [facility] resources with  pt, with written information given, and encouraged pt to apply for a [place] Medicaid plan if he plans to stay in [place]; pt verbalized understanding; pt to DC to self today with 1 day bus pass given    Has the Recommended Plan of Care/Level of Observation been reviewed with the patient's assigned nurse? yes    Does patient/parent or guardian express agreement with the above plan? yes      Referral appointment(s) scheduled? no    Alert team only:   I have discussed findings and recommendations with Dr. Kristan Adame who is in agreement with these recommendations. Legal hold DC'd    Referral information sent to the following outpatient community providers :NA    Referral information sent to the following inpatient community providers :NA    If applicable : Referred  to  Alert Team for legal hold follow up at (time):  JAMAL Stark R.N.  4/21/2022

## 2022-04-22 NOTE — PROGRESS NOTES
ED Provider Progress Note  Patient's care was transferred to me at shift change pending behavioral health evaluation.  Behavioral health evaluated the patient and he is currently not actively suicidal or homicidal therefore does not require admission for psychiatric evaluation.  Patient has a chronic psychiatric condition and has medications.  He was given referrals for our local facilities for follow-up outpatient.  Patient requested 2 bus passes to be given to him but he was given 1 because he refused to take his Zyprexa in the emergency department.  He is being discharged in stable condition to follow-up with outpatient psych and to return if any change or worsening.